# Patient Record
Sex: MALE | Race: WHITE | URBAN - METROPOLITAN AREA
[De-identification: names, ages, dates, MRNs, and addresses within clinical notes are randomized per-mention and may not be internally consistent; named-entity substitution may affect disease eponyms.]

---

## 2023-04-03 ENCOUNTER — OFFICE VISIT (OUTPATIENT)
Dept: URBAN - METROPOLITAN AREA CLINIC 13 | Facility: CLINIC | Age: 63
End: 2023-04-03
Payer: MEDICARE

## 2023-04-03 DIAGNOSIS — G45.3 AMAUROSIS FUGAX: Primary | ICD-10-CM

## 2023-04-03 DIAGNOSIS — E11.3393 TYPE 2 DIAB WITH MOD NONP RTNOP WITHOUT MACULAR EDEMA, BI: ICD-10-CM

## 2023-04-03 PROCEDURE — 92250 FUNDUS PHOTOGRAPHY W/I&R: CPT | Performed by: OPHTHALMOLOGY

## 2023-04-03 PROCEDURE — 92235 FLUORESCEIN ANGRPH MLTIFRAME: CPT | Performed by: OPHTHALMOLOGY

## 2023-04-03 PROCEDURE — 92134 CPTRZ OPH DX IMG PST SGM RTA: CPT | Performed by: OPHTHALMOLOGY

## 2023-04-03 PROCEDURE — 99203 OFFICE O/P NEW LOW 30 MIN: CPT | Performed by: OPHTHALMOLOGY

## 2023-04-03 ASSESSMENT — INTRAOCULAR PRESSURE
OD: 9
OS: 11

## 2023-04-03 NOTE — IMPRESSION/PLAN
Impression: Type 2 diab with mod nonp rtnop without macular edema, bi: G71.8105. OCT OU = no SRF/IRF OU SN CWS OS /286 Plan: The patient is a type 2 diabetic. The patient's examination demonstrates non-proliferative diabetic retinopathy. The findings were discussed with the patient. The importance of good blood sugar, blood pressure and cholesterol control and the relationship to progression of diabetic retinopathy were reviewed with the patient.

## 2023-04-03 NOTE — IMPRESSION/PLAN
Impression: Amaurosis fugax: G45.3. FA OU (04/03/2023) = no leak, no vasc filling defects, no plaques; does have staining of MAs OU Plan: Had one episode of superior gray vision loss in the right eye x 10min, then resolved More recently, second episode of complete graying out of right eye x 10 min, then resolved No colors/f/f/HA Check FA/OCT OU transit OD There is no evidence of [place] plaque or other retinal vessel disease seen on exam today. It is recommended that the patient see his/her Primary Care Physician to undergo a careful medical evaluation to include blood pressure assessment, carotid doppler evaluation, CBC with differential, CRP, ESR, and possible cardiac echocardiogram. We will continue to monitor this condition closely. 

3 m DE/OCT OU